# Patient Record
Sex: MALE | Race: WHITE | NOT HISPANIC OR LATINO | URBAN - METROPOLITAN AREA
[De-identification: names, ages, dates, MRNs, and addresses within clinical notes are randomized per-mention and may not be internally consistent; named-entity substitution may affect disease eponyms.]

---

## 2017-03-25 ENCOUNTER — HISTORICAL (OUTPATIENT)
Dept: URGENT CARE | Facility: CLINIC | Age: 52
End: 2017-03-25

## 2018-06-25 ENCOUNTER — HISTORICAL (OUTPATIENT)
Dept: ADMINISTRATIVE | Facility: HOSPITAL | Age: 53
End: 2018-06-25

## 2022-04-11 ENCOUNTER — HISTORICAL (OUTPATIENT)
Dept: ADMINISTRATIVE | Facility: HOSPITAL | Age: 57
End: 2022-04-11

## 2022-04-25 VITALS
WEIGHT: 220.44 LBS | DIASTOLIC BLOOD PRESSURE: 76 MMHG | SYSTOLIC BLOOD PRESSURE: 115 MMHG | OXYGEN SATURATION: 98 % | BODY MASS INDEX: 27.41 KG/M2 | HEIGHT: 75 IN

## 2022-05-05 NOTE — HISTORICAL [facility]ER
This is a historical note converted from Fredi. Formatting and pictures may have been removed.  Please reference Fredi for original formatting and attached multimedia. Chief Complaint  left knee pain-patient states about a month ago he twisted his knee getting out of the care-he went to urgent care on 5/22/18 and was told to take ibuprofen-he has had no relief from this  History of Present Illness  He is a pleasant 53-year-old male who?twisted his left knee in May 2018?on getting out of the car. ?He had immediate pain localized medially.? The pains continued despite relative rest and then exercising.? The pain is located medially over the joint line.? He notices it worse after he gets up from prolonged seated position. ?The knee feels like it locks on him?and he shakes it to get moving again.? He denies any numbness or tingling.  Review of Systems  Comprehensive review of system?was performed with no exceptions other than noted in the history of present illness  Physical Exam  Vitals & Measurements  BP:?115/76?  HT:?190.2?cm? HT:?190.2?cm? WT:?100?kg? WT:?100?kg? BMI:?27.64?  Gen: WN, WD, NAD  Card/Res: NL breathing, +distal pulses  Abdomen: ND  Standing exam  stance: normal alignment, no significant leg-length discrepancy  gait: no limp  ?   Knee examination  - General comments: unremarkable appearance  ?   - Tenderness: Medial joint line  ?   Knee??????????RIGHT??????????LEFT  Skin: ??????????Intact ??????????Intact  ROM:??????????0-130??????????0-130  Effusion:????? Neg?????????????trace  MJL TTP:????? Neg????????????? +  LJL TTP: ?????? Neg ???????????? Neg  [place]:? ?Neg?????????????? ++  Pat crep:?????? Neg ???????????????Neg  Patella TTPs: Neg ???????????????Neg  Patella grind: Neg??????????? ?Neg  [place]achman: ?????Neg ????????????????????Neg  Pivot shift: ?????Neg ???????????? Neg  Valgus stress: Neg ???????????????Neg  Varus stress: Neg ???????????????Neg  Posterior drawer: Neg ??????????Neg  ?   N-V  ????????????????????intact??????????intact  Hip:?????????????????????????nml?????????? nml  ?   Lower extremity edema:Negative  ?  Assessment/Plan  1.?Medial meniscus tear  ? I suspect he has a medial meniscus tear. ?We will get MRI to confirm the diagnosis. ?I will see him back after the MRI to set up a treatment plan  Ordered:  Clinic Follow up, *Est. 07/25/18 3:00:00 CDT, Order for future visit, Medial meniscus tear, [facility]  MRI Ext Lower Joint Left W/O Contrast, Routine, 06/25/18 15:49:00 CDT, Injury, knee & below, None, Ambulatory, Rad Type, Order for future visit, Medial meniscus tear, [facility], 06/25/18 15:49:00 CDT  Office/Outpatient Visit Level 3 New [id number] PC, [facility], 06/25/18 15:49:00 CDT  ?  Orders:  XR Knee Left 3 Views, Routine, 06/25/18 15:27:00 CDT, Pain, None, Ambulatory, Rad Type, Left knee pain, Not Scheduled, 06/25/18 15:27:00 CDT   Problem List/Past Medical History  Ongoing  [place] (hard of hearing)  Hyperlipemia  Historical  Skin cancer  Procedure/Surgical History  Appendectomy, back repair, skin cancer removal from lip, Tonsillectomy.  Medications  ATORVASTATIN 40 MG TABLET, 40 mg= 1 tab(s), Oral, qPM  Multiple Vitamins oral cap, 1 cap(s), Oral, Daily  Allergies  No Known Medication Allergies  Social History  Alcohol  Current, 1-2 times per year, 05/22/2018  Substance Abuse  Never, 05/22/2018  Tobacco  Never smoker, 03/18/2017  Family History  Family history is negative  Immunizations  Vaccine Date Status   tetanus/diphtheria/pertussis, acel(Tdap) 03/18/2017 Given   Health Maintenance  Health Maintenance  ???Pending?(in the next year)  ??? ??Due?  ??? ? ? ?Alcohol Misuse Screening due??06/25/18??and every 1??year(s)  ??? ? ? ?Aspirin Therapy for CVD Prevention due??06/25/18??and every 1??year(s)  ??? ? ? ?Colorectal Screening due??06/25/18??Variable frequency  ??? ? ? ?Diabetes Screening due??06/25/18??Variable frequency  ??? ? ?  ?Lipid Screening due??06/25/18??Variable frequency  ??? ??Due In Future?  ??? ? ? ?Influenza Vaccine not due until??10/02/18??and every 1??year(s)  ???Satisfied?(in the past 1 year)  ??? ??Satisfied?  ??? ? ? ?Blood Pressure Screening on??06/25/18.??Satisfied by Daniela Donovan  ??? ? ? ?Body Mass Index Check on??06/25/18.??Satisfied by Daniela Donovan.  ??? ? ? ?Depression Screening on??06/25/18.??Satisfied by Daniela Donovan  ??? ? ? ?Obesity Screening on??06/25/18.??Satisfied by Daniela Donovan  ??? ? ? ?Tobacco Use Screening on??06/25/18.??Satisfied by Daniela Donovan  ?  ?  Diagnostic Results  Knee radiographs 6/25/2018: 3 views of the knee show no arthritis

## 2023-02-18 ENCOUNTER — OFFICE VISIT (OUTPATIENT)
Dept: URGENT CARE | Facility: CLINIC | Age: 58
End: 2023-02-18
Payer: COMMERCIAL

## 2023-02-18 VITALS
HEART RATE: 62 BPM | OXYGEN SATURATION: 98 % | BODY MASS INDEX: 27.59 KG/M2 | HEIGHT: 74 IN | SYSTOLIC BLOOD PRESSURE: 117 MMHG | RESPIRATION RATE: 16 BRPM | DIASTOLIC BLOOD PRESSURE: 78 MMHG | TEMPERATURE: 98 F | WEIGHT: 215 LBS

## 2023-02-18 DIAGNOSIS — J06.9 ACUTE URI: Primary | ICD-10-CM

## 2023-02-18 DIAGNOSIS — R05.9 COUGH, UNSPECIFIED TYPE: ICD-10-CM

## 2023-02-18 LAB
CTP QC/QA: YES
MOLECULAR STREP A: NEGATIVE
POC MOLECULAR INFLUENZA A AGN: NEGATIVE
POC MOLECULAR INFLUENZA B AGN: NEGATIVE
SARS-COV-2 RDRP RESP QL NAA+PROBE: NEGATIVE

## 2023-02-18 PROCEDURE — 3074F PR MOST RECENT SYSTOLIC BLOOD PRESSURE < 130 MM HG: ICD-10-PCS | Mod: CPTII,,, | Performed by: PHYSICIAN ASSISTANT

## 2023-02-18 PROCEDURE — 3074F SYST BP LT 130 MM HG: CPT | Mod: CPTII,,, | Performed by: PHYSICIAN ASSISTANT

## 2023-02-18 PROCEDURE — 3008F PR BODY MASS INDEX (BMI) DOCUMENTED: ICD-10-PCS | Mod: CPTII,,, | Performed by: PHYSICIAN ASSISTANT

## 2023-02-18 PROCEDURE — 3008F BODY MASS INDEX DOCD: CPT | Mod: CPTII,,, | Performed by: PHYSICIAN ASSISTANT

## 2023-02-18 PROCEDURE — 87635: ICD-10-PCS | Mod: QW,,, | Performed by: PHYSICIAN ASSISTANT

## 2023-02-18 PROCEDURE — 3078F DIAST BP <80 MM HG: CPT | Mod: CPTII,,, | Performed by: PHYSICIAN ASSISTANT

## 2023-02-18 PROCEDURE — 1159F PR MEDICATION LIST DOCUMENTED IN MEDICAL RECORD: ICD-10-PCS | Mod: CPTII,,, | Performed by: PHYSICIAN ASSISTANT

## 2023-02-18 PROCEDURE — 99203 PR OFFICE/OUTPT VISIT, NEW, LEVL III, 30-44 MIN: ICD-10-PCS | Mod: ,,, | Performed by: PHYSICIAN ASSISTANT

## 2023-02-18 PROCEDURE — 87651 STREP A DNA AMP PROBE: CPT | Mod: QW,,, | Performed by: PHYSICIAN ASSISTANT

## 2023-02-18 PROCEDURE — 1160F PR REVIEW ALL MEDS BY PRESCRIBER/CLIN PHARMACIST DOCUMENTED: ICD-10-PCS | Mod: CPTII,,, | Performed by: PHYSICIAN ASSISTANT

## 2023-02-18 PROCEDURE — 87651 POCT STREP A MOLECULAR: ICD-10-PCS | Mod: QW,,, | Performed by: PHYSICIAN ASSISTANT

## 2023-02-18 PROCEDURE — 3078F PR MOST RECENT DIASTOLIC BLOOD PRESSURE < 80 MM HG: ICD-10-PCS | Mod: CPTII,,, | Performed by: PHYSICIAN ASSISTANT

## 2023-02-18 PROCEDURE — 87635 SARS-COV-2 COVID-19 AMP PRB: CPT | Mod: QW,,, | Performed by: PHYSICIAN ASSISTANT

## 2023-02-18 PROCEDURE — 1160F RVW MEDS BY RX/DR IN RCRD: CPT | Mod: CPTII,,, | Performed by: PHYSICIAN ASSISTANT

## 2023-02-18 PROCEDURE — 87502 INFLUENZA DNA AMP PROBE: CPT | Mod: QW,,, | Performed by: PHYSICIAN ASSISTANT

## 2023-02-18 PROCEDURE — 99203 OFFICE O/P NEW LOW 30 MIN: CPT | Mod: ,,, | Performed by: PHYSICIAN ASSISTANT

## 2023-02-18 PROCEDURE — 87502 POCT INFLUENZA A/B MOLECULAR: ICD-10-PCS | Mod: QW,,, | Performed by: PHYSICIAN ASSISTANT

## 2023-02-18 PROCEDURE — 1159F MED LIST DOCD IN RCRD: CPT | Mod: CPTII,,, | Performed by: PHYSICIAN ASSISTANT

## 2023-02-18 RX ORDER — GABAPENTIN 300 MG/1
300 CAPSULE ORAL 3 TIMES DAILY
COMMUNITY
Start: 2023-01-12

## 2023-02-18 RX ORDER — MULTIVITAMIN
1 TABLET ORAL EVERY MORNING
COMMUNITY

## 2023-02-18 RX ORDER — ATORVASTATIN CALCIUM 10 MG/1
1 TABLET, FILM COATED ORAL NIGHTLY
COMMUNITY
Start: 2022-12-22

## 2023-02-18 NOTE — PROGRESS NOTES
"Subjective:       Patient ID: James Hines III is a 57 y.o. male.    Vitals:  height is 6' 2" (1.88 m) and weight is 97.5 kg (215 lb). His temperature is 97.8 °F (36.6 °C). His blood pressure is 117/78 and his pulse is 62. His respiration is 16 and oxygen saturation is 98%.     Chief Complaint: Cough (Congested, cough, thinks he has cold. Started 3 days ago, took Nyquil last night seemed to help. )    Patient is a 57-year-old male complains of a 3 day history of nasal congestion sore throat cough.  Denies any fever neck stiffness rash shortness of breath GI symptoms.    ROS    Objective:      Physical Exam   Constitutional: He is oriented to person, place, and time. He appears well-developed. He is cooperative.  Non-toxic appearance. He does not appear ill. No distress.   HENT:   Head: Normocephalic and atraumatic.   Ears:   Right Ear: Hearing, tympanic membrane, external ear and ear canal normal.   Left Ear: Hearing, tympanic membrane, external ear and ear canal normal.   Nose: Nose normal. No nasal deformity. No epistaxis.   Mouth/Throat: Uvula is midline, oropharynx is clear and moist and mucous membranes are normal. No trismus in the jaw. Normal dentition. No uvula swelling. No oropharyngeal exudate, posterior oropharyngeal edema or posterior oropharyngeal erythema.   Eyes: Conjunctivae and lids are normal. No scleral icterus.   Neck: Trachea normal and phonation normal. Neck supple. No edema present. No erythema present. No neck rigidity present.   Cardiovascular: Normal rate, regular rhythm, normal heart sounds and normal pulses.   Pulmonary/Chest: Effort normal and breath sounds normal. No respiratory distress. He has no decreased breath sounds. He has no rhonchi.   Abdominal: Normal appearance.   Musculoskeletal: Normal range of motion.         General: No deformity. Normal range of motion.   Neurological: He is alert and oriented to person, place, and time. He exhibits normal muscle tone. Coordination " "normal.   Skin: Skin is warm, dry, intact, not diaphoretic and not pale.   Psychiatric: His speech is normal and behavior is normal. Judgment and thought content normal.   Nursing note and vitals reviewed.           Previous History      Review of patient's allergies indicates:  No Known Allergies    Past Medical History:   Diagnosis Date    Hyperlipidemia      Current Outpatient Medications   Medication Instructions    atorvastatin (LIPITOR) 10 MG tablet 1 tablet, Oral, Nightly    gabapentin (NEURONTIN) 300 mg, Oral, 3 times daily    multivitamin with folic acid 400 mcg Tab 1 tablet, Oral, Every morning     Past Surgical History:   Procedure Laterality Date    APPENDECTOMY      BACK SURGERY       Family History   Problem Relation Age of Onset    Bladder Cancer Mother     Diabetes type II Mother     Colon cancer Father     Hyperlipidemia Father     No Known Problems Sister     No Known Problems Brother        Social History     Tobacco Use    Smoking status: Never    Smokeless tobacco: Never   Substance Use Topics    Alcohol use: Never    Drug use: Never        Physical Exam      Vital Signs Reviewed   /78   Pulse 62   Temp 97.8 °F (36.6 °C)   Resp 16   Ht 6' 2" (1.88 m)   Wt 97.5 kg (215 lb)   SpO2 98%   BMI 27.60 kg/m²        Procedures    Procedures     Labs     Results for orders placed or performed in visit on 02/18/23   POCT COVID-19 Rapid Screening   Result Value Ref Range    POC Rapid COVID Negative Negative     Acceptable Yes    POCT Influenza A/B Molecular   Result Value Ref Range    POC Molecular Influenza A Ag Negative Negative, Not Reported    POC Molecular Influenza B Ag Negative Negative, Not Reported     Acceptable Yes    POCT Strep A, Molecular   Result Value Ref Range    Molecular Strep A, POC Negative Negative     Acceptable Yes        Assessment:       1. Cough, unspecified type            Plan:         Cough, unspecified type  -     POCT " COVID-19 Rapid Screening  -     POCT Influenza A/B Molecular  -     POCT Strep A, Molecular    Drink plenty of fluids.     Get plenty of rest.     Tylenol or Motrin as needed.     Go to the ER with any significant change or worsening of symptoms.     Follow up with your primary care doctor.

## 2023-04-26 ENCOUNTER — OFFICE VISIT (OUTPATIENT)
Dept: URGENT CARE | Facility: CLINIC | Age: 58
End: 2023-04-26
Payer: COMMERCIAL

## 2023-04-26 VITALS
TEMPERATURE: 98 F | OXYGEN SATURATION: 98 % | RESPIRATION RATE: 18 BRPM | WEIGHT: 219.81 LBS | BODY MASS INDEX: 28.21 KG/M2 | SYSTOLIC BLOOD PRESSURE: 120 MMHG | HEIGHT: 74 IN | HEART RATE: 80 BPM | DIASTOLIC BLOOD PRESSURE: 86 MMHG

## 2023-04-26 DIAGNOSIS — B96.89 SINUSITIS, BACTERIAL: ICD-10-CM

## 2023-04-26 DIAGNOSIS — Z11.52 ENCOUNTER FOR SCREENING FOR SEVERE ACUTE RESPIRATORY SYNDROME CORONAVIRUS 2 (SARS-COV-2) INFECTION: Primary | ICD-10-CM

## 2023-04-26 DIAGNOSIS — J02.9 SORE THROAT: ICD-10-CM

## 2023-04-26 DIAGNOSIS — R05.9 COUGH, UNSPECIFIED TYPE: ICD-10-CM

## 2023-04-26 DIAGNOSIS — J32.9 SINUSITIS, BACTERIAL: ICD-10-CM

## 2023-04-26 LAB
CTP QC/QA: YES
CTP QC/QA: YES
MOLECULAR STREP A: NEGATIVE
SARS-COV-2 RDRP RESP QL NAA+PROBE: NEGATIVE

## 2023-04-26 PROCEDURE — 99214 PR OFFICE/OUTPT VISIT, EST, LEVL IV, 30-39 MIN: ICD-10-PCS | Mod: S$PBB,,, | Performed by: FAMILY MEDICINE

## 2023-04-26 PROCEDURE — 87651 STREP A DNA AMP PROBE: CPT | Mod: PBBFAC | Performed by: FAMILY MEDICINE

## 2023-04-26 PROCEDURE — 99214 OFFICE O/P EST MOD 30 MIN: CPT | Mod: S$PBB,,, | Performed by: FAMILY MEDICINE

## 2023-04-26 PROCEDURE — 87635 SARS-COV-2 COVID-19 AMP PRB: CPT | Mod: PBBFAC | Performed by: FAMILY MEDICINE

## 2023-04-26 PROCEDURE — 99214 OFFICE O/P EST MOD 30 MIN: CPT | Mod: PBBFAC | Performed by: FAMILY MEDICINE

## 2023-04-26 RX ORDER — AMOXICILLIN AND CLAVULANATE POTASSIUM 875; 125 MG/1; MG/1
1 TABLET, FILM COATED ORAL 2 TIMES DAILY
Qty: 20 TABLET | Refills: 0 | Status: SHIPPED | OUTPATIENT
Start: 2023-04-26 | End: 2023-05-06

## 2023-04-26 RX ORDER — IBUPROFEN 200 MG
400 TABLET ORAL EVERY 6 HOURS PRN
COMMUNITY

## 2023-04-26 NOTE — PROGRESS NOTES
"Subjective:      Patient ID: James Hines III is a 58 y.o. male.    Vitals:  height is 6' 2" (1.88 m) and weight is 99.7 kg (219 lb 12.8 oz). His oral temperature is 98.1 °F (36.7 °C). His blood pressure is 120/86 and his pulse is 80. His respiration is 18 and oxygen saturation is 98%.     Chief Complaint: Cough (xLast Tuesday. Blood in sputum xToday), Nasal Congestion (xLast Tuesday), and Sore Throat (xLast Tuesday)    Had an episode of epistaxis after sneezing, also blood-tinged sputum.    Cough  Associated symptoms include a sore throat. Pertinent negatives include no chest pain, fever, hemoptysis, rash or wheezing.   Sore Throat   Associated symptoms include coughing. Pertinent negatives include no abdominal pain, diarrhea, drooling, ear discharge, neck pain, trouble swallowing or vomiting.     Constitution: Negative for fever.   HENT:  Positive for sore throat. Negative for ear discharge, drooling, facial swelling, sinus pressure and trouble swallowing.    Neck: Negative for neck pain and neck stiffness.   Cardiovascular:  Negative for chest pain and sob on exertion.   Eyes:  Negative for eye pain.   Respiratory:  Positive for cough. Negative for chest tightness, bloody sputum and wheezing.    Gastrointestinal:  Negative for abdominal pain, vomiting and diarrhea.   Skin:  Negative for rash.   Neurological:  Negative for altered mental status.   Psychiatric/Behavioral:  Negative for altered mental status.     Objective:     Physical Exam   Constitutional: He appears well-developed.  Non-toxic appearance. He does not appear ill. No distress.   HENT:   Head: Atraumatic.   Nose: No purulent discharge. Right sinus exhibits maxillary sinus tenderness. Right sinus exhibits no frontal sinus tenderness. Left sinus exhibits maxillary sinus tenderness. Left sinus exhibits no frontal sinus tenderness.   Mouth/Throat: No posterior oropharyngeal erythema.   Eyes: Right eye exhibits no discharge. Left eye exhibits no " discharge. Extraocular movement intact   Neck: Neck supple.   Cardiovascular: Regular rhythm.   Pulmonary/Chest: Effort normal and breath sounds normal. No respiratory distress. He has no wheezes. He has no rales.   Lymphadenopathy:     He has no cervical adenopathy.   Neurological: He is alert.   Skin: Skin is warm, dry and not diaphoretic.   Psychiatric: His behavior is normal.   Nursing note and vitals reviewed.  Results for orders placed or performed in visit on 04/26/23   POCT COVID-19 Rapid Screening   Result Value Ref Range    POC Rapid COVID Negative Negative     Acceptable Yes    POCT Strep A, Molecular   Result Value Ref Range    Molecular Strep A, POC Negative Negative     Acceptable Yes        Assessment:     1. Encounter for screening for severe acute respiratory syndrome coronavirus 2 (SARS-CoV-2) infection    2. Sore throat    3. Cough, unspecified type    4. Sinusitis, bacterial        Plan:       Encounter for screening for severe acute respiratory syndrome coronavirus 2 (SARS-CoV-2) infection  -     POCT COVID-19 Rapid Screening    Sore throat  -     POCT COVID-19 Rapid Screening  -     POCT Strep A, Molecular    Cough, unspecified type  -     POCT COVID-19 Rapid Screening    Sinusitis, bacterial  -     amoxicillin-clavulanate 875-125mg (AUGMENTIN) 875-125 mg per tablet; Take 1 tablet by mouth 2 (two) times daily. for 10 days  Dispense: 20 tablet; Refill: 0      Will treat as sinusitis.  Take medications as prescribed.  Consider intranasal steroids like Flonase and other over-the-counter medications to treat your symptoms.  Consider saline nasal rinses.  Please follow instructions on patient education material.  Return to urgent care in 3-4 days if symptoms are not improving, immediately if you develop any new or worsening symptoms.